# Patient Record
Sex: MALE | Race: WHITE | NOT HISPANIC OR LATINO | Employment: UNEMPLOYED | URBAN - NONMETROPOLITAN AREA
[De-identification: names, ages, dates, MRNs, and addresses within clinical notes are randomized per-mention and may not be internally consistent; named-entity substitution may affect disease eponyms.]

---

## 2024-03-27 ENCOUNTER — OFFICE VISIT (OUTPATIENT)
Dept: PRIMARY CARE | Facility: CLINIC | Age: 29
End: 2024-03-27
Payer: MEDICAID

## 2024-03-27 VITALS
DIASTOLIC BLOOD PRESSURE: 80 MMHG | WEIGHT: 276 LBS | BODY MASS INDEX: 41.83 KG/M2 | HEART RATE: 73 BPM | HEIGHT: 68 IN | SYSTOLIC BLOOD PRESSURE: 130 MMHG

## 2024-03-27 DIAGNOSIS — N50.89 TESTICLE LUMP: ICD-10-CM

## 2024-03-27 DIAGNOSIS — Z00.00 WELLNESS EXAMINATION: Primary | ICD-10-CM

## 2024-03-27 DIAGNOSIS — Z13.29 SCREENING FOR THYROID DISORDER: ICD-10-CM

## 2024-03-27 DIAGNOSIS — Z13.1 SCREENING FOR DIABETES MELLITUS: ICD-10-CM

## 2024-03-27 DIAGNOSIS — Z87.820 HISTORY OF CONCUSSION: ICD-10-CM

## 2024-03-27 DIAGNOSIS — Z13.220 SCREENING FOR LIPID DISORDERS: ICD-10-CM

## 2024-03-27 DIAGNOSIS — G43.709 CHRONIC MIGRAINE WITHOUT AURA WITHOUT STATUS MIGRAINOSUS, NOT INTRACTABLE: ICD-10-CM

## 2024-03-27 PROCEDURE — 99204 OFFICE O/P NEW MOD 45 MIN: CPT | Performed by: INTERNAL MEDICINE

## 2024-03-27 PROCEDURE — 99385 PREV VISIT NEW AGE 18-39: CPT | Performed by: INTERNAL MEDICINE

## 2024-03-27 PROCEDURE — 1036F TOBACCO NON-USER: CPT | Performed by: INTERNAL MEDICINE

## 2024-03-27 RX ORDER — DOXYCYCLINE 100 MG/1
100 CAPSULE ORAL 2 TIMES DAILY
Qty: 14 CAPSULE | Refills: 0 | Status: SHIPPED | OUTPATIENT
Start: 2024-03-27 | End: 2024-04-03

## 2024-03-27 RX ORDER — SUMATRIPTAN 50 MG/1
50 TABLET, FILM COATED ORAL ONCE AS NEEDED
Qty: 9 TABLET | Refills: 5 | Status: SHIPPED | OUTPATIENT
Start: 2024-03-27 | End: 2025-03-27

## 2024-03-27 ASSESSMENT — ENCOUNTER SYMPTOMS
APPETITE CHANGE: 0
ACTIVITY CHANGE: 0
SINUS PRESSURE: 0
NUMBNESS: 0
WHEEZING: 0
SHORTNESS OF BREATH: 0
BACK PAIN: 0
COUGH: 0
DEPRESSION: 1
CHILLS: 0
NAUSEA: 0
FATIGUE: 0
SINUS PAIN: 0
VOMITING: 0
WEAKNESS: 0
DIARRHEA: 0
ABDOMINAL DISTENTION: 0
SORE THROAT: 0

## 2024-03-27 ASSESSMENT — PATIENT HEALTH QUESTIONNAIRE - PHQ9
1. LITTLE INTEREST OR PLEASURE IN DOING THINGS: SEVERAL DAYS
2. FEELING DOWN, DEPRESSED OR HOPELESS: SEVERAL DAYS
SUM OF ALL RESPONSES TO PHQ9 QUESTIONS 1 AND 2: 2
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
## 2024-03-29 ENCOUNTER — HOSPITAL ENCOUNTER (OUTPATIENT)
Dept: RADIOLOGY | Facility: HOSPITAL | Age: 29
Discharge: HOME | End: 2024-03-29
Payer: MEDICAID

## 2024-03-29 DIAGNOSIS — N50.89 TESTICLE LUMP: ICD-10-CM

## 2024-03-29 PROCEDURE — 93975 VASCULAR STUDY: CPT

## 2024-03-29 PROCEDURE — 76870 US EXAM SCROTUM: CPT | Performed by: RADIOLOGY

## 2024-03-29 PROCEDURE — 93976 VASCULAR STUDY: CPT | Performed by: RADIOLOGY

## 2024-04-01 ENCOUNTER — HOSPITAL ENCOUNTER (OUTPATIENT)
Dept: RADIOLOGY | Facility: HOSPITAL | Age: 29
Discharge: HOME | End: 2024-04-01
Payer: MEDICAID

## 2024-04-01 DIAGNOSIS — G43.709 CHRONIC MIGRAINE WITHOUT AURA WITHOUT STATUS MIGRAINOSUS, NOT INTRACTABLE: ICD-10-CM

## 2024-04-01 DIAGNOSIS — Z87.820 HISTORY OF CONCUSSION: ICD-10-CM

## 2024-04-01 PROCEDURE — 70551 MRI BRAIN STEM W/O DYE: CPT | Performed by: RADIOLOGY

## 2024-04-01 PROCEDURE — 70551 MRI BRAIN STEM W/O DYE: CPT

## 2024-04-10 ENCOUNTER — OFFICE VISIT (OUTPATIENT)
Dept: UROLOGY | Facility: CLINIC | Age: 29
End: 2024-04-10
Payer: MEDICAID

## 2024-04-10 VITALS — BODY MASS INDEX: 41.97 KG/M2 | RESPIRATION RATE: 16 BRPM | WEIGHT: 276 LBS

## 2024-04-10 DIAGNOSIS — N43.3 HYDROCELE, UNSPECIFIED HYDROCELE TYPE: ICD-10-CM

## 2024-04-10 DIAGNOSIS — R35.1 NOCTURIA: ICD-10-CM

## 2024-04-10 DIAGNOSIS — N50.89 TESTICLE LUMP: ICD-10-CM

## 2024-04-10 PROCEDURE — 1036F TOBACCO NON-USER: CPT | Performed by: UROLOGY

## 2024-04-10 PROCEDURE — 99203 OFFICE O/P NEW LOW 30 MIN: CPT | Performed by: UROLOGY

## 2024-04-10 ASSESSMENT — ENCOUNTER SYMPTOMS
PSYCHIATRIC NEGATIVE: 1
COUGH: 0
ENDOCRINE NEGATIVE: 1
NAUSEA: 0
SHORTNESS OF BREATH: 0
DIFFICULTY URINATING: 0
FEVER: 0
EYES NEGATIVE: 1
ALLERGIC/IMMUNOLOGIC NEGATIVE: 1
CHILLS: 0

## 2024-04-10 NOTE — H&P (VIEW-ONLY)
Subjective   Patient ID: Titus Inman II is a 28 y.o. male.    HPI  Patient is here to establish for a testicular/scrotal lump that has been increasing in size. . Scrotal ultrasound showed a trace of bilateral hydroceles. He has had this for awhile. He said it occasionally would drain. It has not been draining recently. Patient states has some scrotal discomfort. No hematuria, No dysuria. Nocturia x 1, depending on fluid intake.       Review of Systems   Constitutional:  Negative for chills and fever.   HENT: Negative.     Eyes: Negative.    Respiratory:  Negative for cough and shortness of breath.    Cardiovascular:  Negative for chest pain and leg swelling.   Gastrointestinal:  Negative for nausea.   Endocrine: Negative.    Genitourinary:  Negative for difficulty urinating.        Negative except for documented in HPI   Allergic/Immunologic: Negative.    Neurological:         Alert & oriented X 3   Hematological:         Denies blood thinners   Psychiatric/Behavioral: Negative.         Objective   Physical Exam  Vitals and nursing note reviewed.   Constitutional:       General: He is not in acute distress.     Appearance: Normal appearance.   Pulmonary:      Effort: Pulmonary effort is normal.   Abdominal:      Tenderness: There is no abdominal tenderness.   Genitourinary:     Comments: Kidneys non palpable bilaterally  Bladder non palpable or tender  Scrotum no mass, No hydrocele Large sebaceuos cyst present 3cm  Epididymis- No spermatocele. Non Tender.  Testicles: No mass. Soft. Some atrophy  Urethra: No discharge  Penis within normal limits... No lesions. Circumcised  Prostate - deferred  Neurological:      Mental Status: He is alert.         Assessment/Plan   Diagnoses and all orders for this visit:  Testicle lump  -     Referral to Urology  Hydrocele, unspecified hydrocele type  Nocturia      U/S reviewed  Discussed op[tions for large sebaceous cyst  Will plan Surgical excision  Discussed monthly self  exams  Observe mild LUTS    F/U OR excision of scrotal cyst

## 2024-04-19 ENCOUNTER — PREP FOR PROCEDURE (OUTPATIENT)
Dept: UROLOGY | Facility: CLINIC | Age: 29
End: 2024-04-19
Payer: MEDICAID

## 2024-04-19 DIAGNOSIS — L72.9 SCROTAL CYST: Primary | ICD-10-CM

## 2024-04-24 NOTE — PREPROCEDURE INSTRUCTIONS
No outpatient medications have been marked as taking for the 4/30/24 encounter (Hospital Encounter).                       NPO Instructions:    Nothing to eat or drink after midnight    Additional Instructions:     Will need  home, will receive call day before surgery with arrival time                                                 no

## 2024-04-30 ENCOUNTER — HOSPITAL ENCOUNTER (OUTPATIENT)
Facility: HOSPITAL | Age: 29
Setting detail: OUTPATIENT SURGERY
Discharge: HOME | End: 2024-04-30
Attending: UROLOGY | Admitting: UROLOGY
Payer: MEDICAID

## 2024-04-30 ENCOUNTER — ANESTHESIA EVENT (OUTPATIENT)
Dept: OPERATING ROOM | Facility: HOSPITAL | Age: 29
End: 2024-04-30
Payer: MEDICAID

## 2024-04-30 ENCOUNTER — ANESTHESIA (OUTPATIENT)
Dept: OPERATING ROOM | Facility: HOSPITAL | Age: 29
End: 2024-04-30
Payer: MEDICAID

## 2024-04-30 VITALS
WEIGHT: 275 LBS | SYSTOLIC BLOOD PRESSURE: 112 MMHG | BODY MASS INDEX: 41.81 KG/M2 | TEMPERATURE: 97 F | OXYGEN SATURATION: 97 % | HEART RATE: 69 BPM | RESPIRATION RATE: 16 BRPM | DIASTOLIC BLOOD PRESSURE: 72 MMHG

## 2024-04-30 DIAGNOSIS — L72.9 SCROTAL CYST: Primary | ICD-10-CM

## 2024-04-30 PROBLEM — F43.10 PTSD (POST-TRAUMATIC STRESS DISORDER): Status: ACTIVE | Noted: 2024-04-30

## 2024-04-30 PROCEDURE — 2500000004 HC RX 250 GENERAL PHARMACY W/ HCPCS (ALT 636 FOR OP/ED): Performed by: ANESTHESIOLOGY

## 2024-04-30 PROCEDURE — 7100000002 HC RECOVERY ROOM TIME - EACH INCREMENTAL 1 MINUTE: Performed by: UROLOGY

## 2024-04-30 PROCEDURE — 11420 EXC H-F-NK-SP B9+MARG 0.5/<: CPT | Performed by: UROLOGY

## 2024-04-30 PROCEDURE — 2500000005 HC RX 250 GENERAL PHARMACY W/O HCPCS: Performed by: ANESTHESIOLOGY

## 2024-04-30 PROCEDURE — 7100000010 HC PHASE TWO TIME - EACH INCREMENTAL 1 MINUTE: Performed by: UROLOGY

## 2024-04-30 PROCEDURE — 3600000002 HC OR TIME - INITIAL BASE CHARGE - PROCEDURE LEVEL TWO: Performed by: UROLOGY

## 2024-04-30 PROCEDURE — 7100000001 HC RECOVERY ROOM TIME - INITIAL BASE CHARGE: Performed by: UROLOGY

## 2024-04-30 PROCEDURE — 2720000007 HC OR 272 NO HCPCS: Performed by: UROLOGY

## 2024-04-30 PROCEDURE — 3600000007 HC OR TIME - EACH INCREMENTAL 1 MINUTE - PROCEDURE LEVEL TWO: Performed by: UROLOGY

## 2024-04-30 PROCEDURE — 3700000002 HC GENERAL ANESTHESIA TIME - EACH INCREMENTAL 1 MINUTE: Performed by: UROLOGY

## 2024-04-30 PROCEDURE — 88304 TISSUE EXAM BY PATHOLOGIST: CPT | Mod: TC,SUR,SAMLAB | Performed by: UROLOGY

## 2024-04-30 PROCEDURE — 3700000001 HC GENERAL ANESTHESIA TIME - INITIAL BASE CHARGE: Performed by: UROLOGY

## 2024-04-30 PROCEDURE — 88304 TISSUE EXAM BY PATHOLOGIST: CPT | Performed by: STUDENT IN AN ORGANIZED HEALTH CARE EDUCATION/TRAINING PROGRAM

## 2024-04-30 PROCEDURE — 7100000009 HC PHASE TWO TIME - INITIAL BASE CHARGE: Performed by: UROLOGY

## 2024-04-30 RX ORDER — HYDROMORPHONE HYDROCHLORIDE 2 MG/ML
0.5 INJECTION, SOLUTION INTRAMUSCULAR; INTRAVENOUS; SUBCUTANEOUS EVERY 5 MIN PRN
Status: DISCONTINUED | OUTPATIENT
Start: 2024-04-30 | End: 2024-04-30 | Stop reason: HOSPADM

## 2024-04-30 RX ORDER — CEPHALEXIN 500 MG/1
500 CAPSULE ORAL 2 TIMES DAILY
Qty: 6 CAPSULE | Refills: 0 | Status: SHIPPED | OUTPATIENT
Start: 2024-04-30 | End: 2024-05-03

## 2024-04-30 RX ORDER — SODIUM CHLORIDE, SODIUM LACTATE, POTASSIUM CHLORIDE, CALCIUM CHLORIDE 600; 310; 30; 20 MG/100ML; MG/100ML; MG/100ML; MG/100ML
100 INJECTION, SOLUTION INTRAVENOUS CONTINUOUS
Status: DISCONTINUED | OUTPATIENT
Start: 2024-04-30 | End: 2024-04-30 | Stop reason: HOSPADM

## 2024-04-30 RX ORDER — MIDAZOLAM HYDROCHLORIDE 1 MG/ML
2 INJECTION INTRAMUSCULAR; INTRAVENOUS ONCE AS NEEDED
Status: COMPLETED | OUTPATIENT
Start: 2024-04-30 | End: 2024-04-30

## 2024-04-30 RX ORDER — MIDAZOLAM HYDROCHLORIDE 1 MG/ML
INJECTION INTRAMUSCULAR; INTRAVENOUS AS NEEDED
Status: DISCONTINUED | OUTPATIENT
Start: 2024-04-30 | End: 2024-04-30

## 2024-04-30 RX ORDER — ONDANSETRON HYDROCHLORIDE 2 MG/ML
4 INJECTION, SOLUTION INTRAVENOUS ONCE
Status: COMPLETED | OUTPATIENT
Start: 2024-04-30 | End: 2024-04-30

## 2024-04-30 RX ORDER — OXYCODONE HYDROCHLORIDE 5 MG/1
5 TABLET ORAL EVERY 4 HOURS PRN
Status: DISCONTINUED | OUTPATIENT
Start: 2024-04-30 | End: 2024-04-30 | Stop reason: HOSPADM

## 2024-04-30 RX ORDER — HYDROCODONE BITARTRATE AND ACETAMINOPHEN 5; 325 MG/1; MG/1
1 TABLET ORAL EVERY 6 HOURS PRN
Qty: 20 TABLET | Refills: 0 | Status: SHIPPED | OUTPATIENT
Start: 2024-04-30

## 2024-04-30 RX ORDER — PROPOFOL 10 MG/ML
INJECTION, EMULSION INTRAVENOUS AS NEEDED
Status: DISCONTINUED | OUTPATIENT
Start: 2024-04-30 | End: 2024-04-30

## 2024-04-30 RX ORDER — ONDANSETRON HYDROCHLORIDE 2 MG/ML
4 INJECTION, SOLUTION INTRAVENOUS ONCE AS NEEDED
Status: DISCONTINUED | OUTPATIENT
Start: 2024-04-30 | End: 2024-04-30 | Stop reason: HOSPADM

## 2024-04-30 RX ORDER — LIDOCAINE HYDROCHLORIDE 10 MG/ML
INJECTION, SOLUTION EPIDURAL; INFILTRATION; INTRACAUDAL; PERINEURAL AS NEEDED
Status: DISCONTINUED | OUTPATIENT
Start: 2024-04-30 | End: 2024-04-30

## 2024-04-30 RX ORDER — FENTANYL CITRATE 50 UG/ML
INJECTION, SOLUTION INTRAMUSCULAR; INTRAVENOUS AS NEEDED
Status: DISCONTINUED | OUTPATIENT
Start: 2024-04-30 | End: 2024-04-30

## 2024-04-30 RX ADMIN — FENTANYL CITRATE 100 MCG: 50 INJECTION, SOLUTION INTRAMUSCULAR; INTRAVENOUS at 11:14

## 2024-04-30 RX ADMIN — PROPOFOL 50 MG: 10 INJECTION, EMULSION INTRAVENOUS at 11:20

## 2024-04-30 RX ADMIN — MIDAZOLAM HYDROCHLORIDE 2 MG: 1 INJECTION, SOLUTION INTRAMUSCULAR; INTRAVENOUS at 11:05

## 2024-04-30 RX ADMIN — PROPOFOL 50 MG: 10 INJECTION, EMULSION INTRAVENOUS at 11:28

## 2024-04-30 RX ADMIN — SODIUM CHLORIDE, POTASSIUM CHLORIDE, SODIUM LACTATE AND CALCIUM CHLORIDE 100 ML/HR: 600; 310; 30; 20 INJECTION, SOLUTION INTRAVENOUS at 11:05

## 2024-04-30 RX ADMIN — MIDAZOLAM HYDROCHLORIDE 2 MG: 1 INJECTION, SOLUTION INTRAMUSCULAR; INTRAVENOUS at 11:14

## 2024-04-30 RX ADMIN — ONDANSETRON 4 MG: 2 INJECTION INTRAMUSCULAR; INTRAVENOUS at 11:04

## 2024-04-30 RX ADMIN — LIDOCAINE HYDROCHLORIDE 4 ML: 10 INJECTION, SOLUTION EPIDURAL; INFILTRATION; INTRACAUDAL; PERINEURAL at 11:14

## 2024-04-30 RX ADMIN — PROPOFOL 150 MG: 10 INJECTION, EMULSION INTRAVENOUS at 11:15

## 2024-04-30 RX ADMIN — PROPOFOL 50 MG: 10 INJECTION, EMULSION INTRAVENOUS at 11:34

## 2024-04-30 SDOH — HEALTH STABILITY: MENTAL HEALTH: CURRENT SMOKER: 0

## 2024-04-30 ASSESSMENT — PAIN SCALES - GENERAL
PAINLEVEL_OUTOF10: 0 - NO PAIN
PAINLEVEL_OUTOF10: 4
PAINLEVEL_OUTOF10: 0 - NO PAIN
PAIN_LEVEL: 3

## 2024-04-30 ASSESSMENT — PAIN - FUNCTIONAL ASSESSMENT: PAIN_FUNCTIONAL_ASSESSMENT: 0-10

## 2024-04-30 ASSESSMENT — COLUMBIA-SUICIDE SEVERITY RATING SCALE - C-SSRS
6. HAVE YOU EVER DONE ANYTHING, STARTED TO DO ANYTHING, OR PREPARED TO DO ANYTHING TO END YOUR LIFE?: NO
2. HAVE YOU ACTUALLY HAD ANY THOUGHTS OF KILLING YOURSELF?: NO
1. IN THE PAST MONTH, HAVE YOU WISHED YOU WERE DEAD OR WISHED YOU COULD GO TO SLEEP AND NOT WAKE UP?: NO

## 2024-04-30 NOTE — ANESTHESIA PREPROCEDURE EVALUATION
Patient: Titus Inman II    Procedure Information       Date/Time: 04/30/24 1230    Procedure: Excision Lesion Male Genitalia    Location: [facility] OR 01 / Virtual [facility] OR    Surgeons: Junior Barrera MD            Relevant Problems   Anesthesia (within normal limits)      Cardiac (within normal limits)      Pulmonary (within normal limits)      Neuro   (+) PTSD (post-traumatic stress disorder)      GI (within normal limits)      /Renal (within normal limits)      Liver (within normal limits)      Endocrine (within normal limits)      Hematology (within normal limits)      Musculoskeletal (within normal limits)      HEENT (within normal limits)      ID (within normal limits)      Skin (within normal limits)      GYN (within normal limits)       Clinical information reviewed:   Tobacco  Allergies  Meds  Problems  Med Hx  Surg Hx   Fam Hx  Soc   Hx        NPO Detail:  No data recorded     Physical Exam    Airway  Mallampati: II  TM distance: >3 FB  Neck ROM: full     Cardiovascular   Rhythm: regular  Rate: normal     Dental - normal exam     Pulmonary   Breath sounds clear to auscultation     Abdominal   (+) obese             Anesthesia Plan    History of general anesthesia?: no  History of complications of general anesthesia?: no    ASA 2     general     The patient is not a current smoker.    intravenous induction   Postoperative administration of opioids is intended.  Anesthetic plan and risks discussed with patient.

## 2024-04-30 NOTE — ANESTHESIA POSTPROCEDURE EVALUATION
Patient: Titus Inman II    Procedure Summary       Date: 04/30/24 Room / Location: [facility] OR 01 / Virtual [facility] OR    Anesthesia Start: 1111 Anesthesia Stop: 1153    Procedure: Excision Lesion Male Genitalia Diagnosis:       Scrotal cyst      (Scrotal cyst [L72.9])    Surgeons: Junior Barrera MD Responsible Provider: Kwesi Snell MD    Anesthesia Type: general ASA Status: 2            Anesthesia Type: general    Vitals Value Taken Time   BP  04/30/24 1153   Temp  04/30/24 1153   Pulse 80 04/30/24 1153   Resp 12 04/30/24 1153   SpO2 99 04/30/24 1153       Anesthesia Post Evaluation    Patient location during evaluation: PACU  Patient participation: complete - patient participated  Level of consciousness: awake and alert  Pain score: 3  Pain management: adequate  Multimodal analgesia pain management approach  Airway patency: patent  Cardiovascular status: acceptable  Respiratory status: acceptable  Hydration status: acceptable  Postoperative Nausea and Vomiting: none        No notable events documented.

## 2024-04-30 NOTE — ANESTHESIA PROCEDURE NOTES
Airway  Date/Time: 4/30/2024 11:16 AM  Urgency: elective    Airway not difficult    Staffing  Performed: attending   Authorized by: Kwesi Snell MD    Performed by: Kwesi Snell MD  Patient location during procedure: OR    Indications and Patient Condition  Indications for airway management: anesthesia  Spontaneous ventilation: present  Sedation level: deep  Preoxygenated: yes  Patient position: sniffing  Mask difficulty assessment: 1 - vent by mask    Final Airway Details  Final airway type: supraglottic airway      Successful airway: Supreme  Size 5     Number of attempts at approach: 1  Number of other approaches attempted: 0

## 2024-05-01 NOTE — OP NOTE
Excision Lesion Male Genitalia Operative Note     Date: 2024  OR Location: [facility] OR    Name: Titus Inman II, : 1995, Age: 28 y.o., MRN: [id number], Sex: male    Diagnosis  Pre-op Diagnosis     * Scrotal cyst [L72.9] Post-op Diagnosis     * Scrotal cyst [L72.9]     Procedures  Excision Lesion Male Genitalia  41567 - MO EXC B9 LESION MRGN XCP SK TG S/N/H/F/G 0.5 CM/<      Surgeons      * Junior Barrera - Primary    Resident/Fellow/Other Assistant:  Surgeons and Role:  * No surgeons found with a matching role *    Procedure Summary  Anesthesia: General  ASA: II  Anesthesia Staff: Anesthesiologist: Kwesi Snell MD  Estimated Blood Loss: 0mL  Intra-op Medications: Administrations occurring from 1230 to 1300 on 24:  * No intraprocedure medications in log *           Anesthesia Record               Intraprocedure I/O Totals       None           Specimen:   ID Type Source Tests Collected by Time   1 : Scrotal Mass Tissue SOFT TISSUE MASS RESECTION SURGICAL PATHOLOGY EXAM Junior Barrera MD 2024 1130        Staff:   Circulator: David Carmen RN  Scrub Person: Bere Fuentes RN              Indications: Titus Inman II is an 28 y.o. male who is having surgery for Scrotal cyst [L72.9].     The patient was seen in the preoperative area. The risks, benefits, complications, treatment options, non-operative alternatives, expected recovery and outcomes were discussed with the patient. The possibilities of reaction to medication, pulmonary aspiration, injury to surrounding structures, bleeding, recurrent infection, the need for additional procedures, failure to diagnose a condition, and creating a complication requiring transfusion or operation were discussed with the patient. The patient concurred with the proposed plan, giving informed consent.  The site of surgery was properly noted/marked if necessary per policy. The patient has been actively warmed in preoperative area.   Preoperative diagnosis:  Scrotal lesions/ sebaceous cyst  Postoperative diagnosis: same  Procedure Excision of  scrotal lesion  Physician: Bruce  Anesthesia: Gen.  Estimated blood loss: Minimal    Indications and consent: The patient presents for surgical  excision of scrotal lesion. After the risks, benefits, alternatives, and indications of the procedure were explained to the patient they consented.    Procedure: Patient was brought to the operating room and placed on the table in the supine position. After adequate anesthesia was obtained the patient was prepped and draped in the standard surgical fashion. The 3cm previously noted lesion was excised using an elliptical incision..  Electrocautery was used to obtain hemostasis and to fulgurate the base. The wound was then irrigated and then the scrotal skin edges were reapprozimated using interrupted sutures.  . Neosporin was applied to the treated areas. The patient tolerated the procedure well and there were no complications   Attending Attestation: I was present and scrubbed for the entire procedure.    Junior Barrera  Phone Number: [phone]

## 2024-05-07 LAB
LABORATORY COMMENT REPORT: NORMAL
PATH REPORT.FINAL DX SPEC: NORMAL
PATH REPORT.GROSS SPEC: NORMAL
PATH REPORT.RELEVANT HX SPEC: NORMAL
PATH REPORT.TOTAL CANCER: NORMAL

## 2024-05-15 ASSESSMENT — ENCOUNTER SYMPTOMS
COUGH: 0
DIFFICULTY URINATING: 0
ENDOCRINE NEGATIVE: 1
EYES NEGATIVE: 1
CHILLS: 0
NAUSEA: 0
ALLERGIC/IMMUNOLOGIC NEGATIVE: 1
FEVER: 0
PSYCHIATRIC NEGATIVE: 1
SHORTNESS OF BREATH: 0

## 2024-05-15 NOTE — PROGRESS NOTES
Subjective   Patient ID: Titus Inman II is a 28 y.o. male.    HPI  Patient is here for incision issues and to review path. . S/P excision of scrotal lesion on 5/1/24. He states 3-4 days after procedure his incision started to open up. He states this is starting to close slowly and is no longer an issue.  Path report showed epidermal inclusion cyst 1.1cm.       Review of Systems   Constitutional:  Negative for chills and fever.   HENT: Negative.     Eyes: Negative.    Respiratory:  Negative for cough and shortness of breath.    Cardiovascular:  Negative for chest pain and leg swelling.   Gastrointestinal:  Negative for nausea.   Endocrine: Negative.    Genitourinary:  Negative for difficulty urinating.        Negative except for documented in HPI   Allergic/Immunologic: Negative.    Neurological:         Alert & oriented X 3   Hematological:         Denies blood thinners   Psychiatric/Behavioral: Negative.         Objective   Physical Exam  Vitals and nursing note reviewed.   Constitutional:       General: He is not in acute distress.     Appearance: Normal appearance.   Pulmonary:      Effort: Pulmonary effort is normal.   Abdominal:      Tenderness: There is no abdominal tenderness.   Genitourinary:     Comments: Kidneys non palpable bilaterally  Bladder non palpable or tender  Scrotum no mass, No hydrocele. Healing well with minimal skin seperation. No erythema  Epididymis- No spermatocele. Non Tender.  Testicles: No mass. Symmetric  Urethra: No discharge.   Penis within normal limits... No lesions  Prostate - deferred  Neurological:      Mental Status: He is alert.       Assessment/Plan   Diagnoses and all orders for this visit:  Scrotal sebaceous cyst  Scrotal cyst      Path report reviewed.. Questions answered  Wound healing. No signs of infection    F/U PRN

## 2024-05-16 ENCOUNTER — OFFICE VISIT (OUTPATIENT)
Dept: UROLOGY | Facility: CLINIC | Age: 29
End: 2024-05-16
Payer: MEDICAID

## 2024-05-16 VITALS — RESPIRATION RATE: 16 BRPM | BODY MASS INDEX: 41.66 KG/M2 | WEIGHT: 274 LBS

## 2024-05-16 DIAGNOSIS — L72.3 SCROTAL SEBACEOUS CYST: Primary | ICD-10-CM

## 2024-05-16 DIAGNOSIS — L72.9 SCROTAL CYST: ICD-10-CM

## 2024-05-16 PROCEDURE — 1036F TOBACCO NON-USER: CPT | Performed by: UROLOGY

## 2024-05-16 PROCEDURE — 99213 OFFICE O/P EST LOW 20 MIN: CPT | Performed by: UROLOGY

## 2024-06-06 ENCOUNTER — LAB (OUTPATIENT)
Dept: LAB | Facility: LAB | Age: 29
End: 2024-06-06
Payer: MEDICAID

## 2024-06-06 DIAGNOSIS — Z13.220 SCREENING FOR LIPID DISORDERS: ICD-10-CM

## 2024-06-06 DIAGNOSIS — Z13.29 SCREENING FOR THYROID DISORDER: ICD-10-CM

## 2024-06-06 DIAGNOSIS — Z00.00 WELLNESS EXAMINATION: ICD-10-CM

## 2024-06-06 DIAGNOSIS — Z13.1 SCREENING FOR DIABETES MELLITUS: ICD-10-CM

## 2024-06-06 LAB
ALBUMIN SERPL BCP-MCNC: 4 G/DL (ref 3.4–5)
ALP SERPL-CCNC: 138 U/L (ref 33–120)
ALT SERPL W P-5'-P-CCNC: 15 U/L (ref 10–52)
ANION GAP SERPL CALC-SCNC: 11 MMOL/L (ref 10–20)
AST SERPL W P-5'-P-CCNC: 11 U/L (ref 9–39)
BASOPHILS # BLD AUTO: 0.07 X10*3/UL (ref 0–0.1)
BASOPHILS NFR BLD AUTO: 0.8 %
BILIRUB SERPL-MCNC: 0.8 MG/DL (ref 0–1.2)
BUN SERPL-MCNC: 11 MG/DL (ref 6–23)
CALCIUM SERPL-MCNC: 9 MG/DL (ref 8.6–10.3)
CHLORIDE SERPL-SCNC: 102 MMOL/L (ref 98–107)
CHOLEST SERPL-MCNC: 206 MG/DL (ref 0–199)
CHOLESTEROL/HDL RATIO: 5
CO2 SERPL-SCNC: 29 MMOL/L (ref 21–32)
CREAT SERPL-MCNC: 0.92 MG/DL (ref 0.5–1.3)
EGFRCR SERPLBLD CKD-EPI 2021: >90 ML/MIN/1.73M*2
EOSINOPHIL # BLD AUTO: 0.63 X10*3/UL (ref 0–0.7)
EOSINOPHIL NFR BLD AUTO: 6.9 %
ERYTHROCYTE [DISTWIDTH] IN BLOOD BY AUTOMATED COUNT: 11.9 % (ref 11.5–14.5)
EST. AVERAGE GLUCOSE BLD GHB EST-MCNC: 97 MG/DL
GLUCOSE SERPL-MCNC: 112 MG/DL (ref 74–99)
HBA1C MFR BLD: 5 %
HCT VFR BLD AUTO: 44.9 % (ref 41–52)
HDLC SERPL-MCNC: 41 MG/DL
HGB BLD-MCNC: 14.7 G/DL (ref 13.5–17.5)
IMM GRANULOCYTES # BLD AUTO: 0.03 X10*3/UL (ref 0–0.7)
IMM GRANULOCYTES NFR BLD AUTO: 0.3 % (ref 0–0.9)
LDLC SERPL CALC-MCNC: 143 MG/DL
LYMPHOCYTES # BLD AUTO: 1.53 X10*3/UL (ref 1.2–4.8)
LYMPHOCYTES NFR BLD AUTO: 16.7 %
MCH RBC QN AUTO: 28.2 PG (ref 26–34)
MCHC RBC AUTO-ENTMCNC: 32.7 G/DL (ref 32–36)
MCV RBC AUTO: 86 FL (ref 80–100)
MONOCYTES # BLD AUTO: 0.71 X10*3/UL (ref 0.1–1)
MONOCYTES NFR BLD AUTO: 7.8 %
NEUTROPHILS # BLD AUTO: 6.18 X10*3/UL (ref 1.2–7.7)
NEUTROPHILS NFR BLD AUTO: 67.5 %
NON HDL CHOLESTEROL: 165 MG/DL (ref 0–149)
NRBC BLD-RTO: 0 /100 WBCS (ref 0–0)
PLATELET # BLD AUTO: 348 X10*3/UL (ref 150–450)
POTASSIUM SERPL-SCNC: 4.1 MMOL/L (ref 3.5–5.3)
PROT SERPL-MCNC: 7 G/DL (ref 6.4–8.2)
RBC # BLD AUTO: 5.22 X10*6/UL (ref 4.5–5.9)
SODIUM SERPL-SCNC: 138 MMOL/L (ref 136–145)
TRIGL SERPL-MCNC: 112 MG/DL (ref 0–149)
TSH SERPL-ACNC: 1.58 MIU/L (ref 0.44–3.98)
VLDL: 22 MG/DL (ref 0–40)
WBC # BLD AUTO: 9.2 X10*3/UL (ref 4.4–11.3)

## 2024-06-06 PROCEDURE — 84443 ASSAY THYROID STIM HORMONE: CPT

## 2024-06-06 PROCEDURE — 83036 HEMOGLOBIN GLYCOSYLATED A1C: CPT

## 2024-06-06 PROCEDURE — 36415 COLL VENOUS BLD VENIPUNCTURE: CPT

## 2024-06-06 PROCEDURE — 85025 COMPLETE CBC W/AUTO DIFF WBC: CPT

## 2024-06-06 PROCEDURE — 80061 LIPID PANEL: CPT

## 2024-06-06 PROCEDURE — 80053 COMPREHEN METABOLIC PANEL: CPT

## 2024-06-25 ASSESSMENT — ENCOUNTER SYMPTOMS
CHILLS: 0
PSYCHIATRIC NEGATIVE: 1
SHORTNESS OF BREATH: 0
FEVER: 0
DIFFICULTY URINATING: 0
EYES NEGATIVE: 1
COUGH: 0
NAUSEA: 0
ENDOCRINE NEGATIVE: 1
ALLERGIC/IMMUNOLOGIC NEGATIVE: 1

## 2024-06-25 NOTE — PROGRESS NOTES
Subjective   Patient ID: Titus Inman II is a 29 y.o. male.    HPI  Patient is here for  S/P excision of scrotal lesion on 5/1/24. He states 3-4 days after procedure his incision started to open up. He states this is starting to close slowly and is no longer an issue.  Path report showed epidermal inclusion cyst 1.1cm.          Review of Systems   Constitutional:  Negative for chills and fever.   HENT: Negative.     Eyes: Negative.    Respiratory:  Negative for cough and shortness of breath.    Cardiovascular:  Negative for chest pain and leg swelling.   Gastrointestinal:  Negative for nausea.   Endocrine: Negative.    Genitourinary:  Negative for difficulty urinating.        Negative except for documented in HPI   Allergic/Immunologic: Negative.    Neurological:         Alert & oriented X 3   Hematological:         Denies blood thinners   Psychiatric/Behavioral: Negative.         Objective   Physical Exam  Vitals and nursing note reviewed.   Constitutional:       General: He is not in acute distress.     Appearance: Normal appearance.   Pulmonary:      Effort: Pulmonary effort is normal.   Abdominal:      Tenderness: There is no abdominal tenderness.   Genitourinary:     Comments: Kidneys non palpable bilaterally  Bladder non palpable or tender  Scrotum no mass, No hydrocele. Well healed  Epididymis- No spermatocele. Non Tender.  Testicles: No mass  Urethra: No discharge  Penis within normal limits... No lesions  Prostate - deferred  Neurological:      Mental Status: He is alert.         Assessment/Plan   Diagnoses and all orders for this visit:  Nocturia  Hydrocele, unspecified hydrocele type    Path report reviewed. Questions answered  Patient well helaed and will do self exams  Observe Mild LUTS    F/U PRN

## 2024-06-26 ENCOUNTER — APPOINTMENT (OUTPATIENT)
Dept: UROLOGY | Facility: CLINIC | Age: 29
End: 2024-06-26
Payer: MEDICAID

## 2024-06-26 VITALS — WEIGHT: 274 LBS | RESPIRATION RATE: 16 BRPM | BODY MASS INDEX: 41.66 KG/M2

## 2024-06-26 DIAGNOSIS — N43.3 HYDROCELE, UNSPECIFIED HYDROCELE TYPE: ICD-10-CM

## 2024-06-26 DIAGNOSIS — R35.1 NOCTURIA: ICD-10-CM

## 2024-06-26 PROCEDURE — 1036F TOBACCO NON-USER: CPT | Performed by: UROLOGY

## 2024-06-26 PROCEDURE — 99213 OFFICE O/P EST LOW 20 MIN: CPT | Performed by: UROLOGY

## 2024-06-27 ENCOUNTER — APPOINTMENT (OUTPATIENT)
Dept: PRIMARY CARE | Facility: CLINIC | Age: 29
End: 2024-06-27
Payer: MEDICAID

## 2024-06-27 VITALS
SYSTOLIC BLOOD PRESSURE: 120 MMHG | WEIGHT: 273 LBS | HEART RATE: 87 BPM | HEIGHT: 68 IN | DIASTOLIC BLOOD PRESSURE: 80 MMHG | BODY MASS INDEX: 41.37 KG/M2

## 2024-06-27 DIAGNOSIS — R20.0 BILATERAL HAND NUMBNESS: Primary | ICD-10-CM

## 2024-06-27 DIAGNOSIS — G43.E09 CHRONIC MIGRAINE WITH AURA WITHOUT STATUS MIGRAINOSUS, NOT INTRACTABLE: ICD-10-CM

## 2024-06-27 DIAGNOSIS — R11.0 NAUSEA: ICD-10-CM

## 2024-06-27 PROCEDURE — 1036F TOBACCO NON-USER: CPT | Performed by: INTERNAL MEDICINE

## 2024-06-27 PROCEDURE — 99214 OFFICE O/P EST MOD 30 MIN: CPT | Performed by: INTERNAL MEDICINE

## 2024-06-27 RX ORDER — ONDANSETRON 4 MG/1
4 TABLET, ORALLY DISINTEGRATING ORAL EVERY 8 HOURS PRN
Qty: 20 TABLET | Refills: 0 | Status: SHIPPED | OUTPATIENT
Start: 2024-06-27 | End: 2024-07-04

## 2024-06-27 ASSESSMENT — ENCOUNTER SYMPTOMS
WEAKNESS: 1
NUMBNESS: 0
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
## 2024-07-09 ENCOUNTER — TELEPHONE (OUTPATIENT)
Dept: PRIMARY CARE | Facility: CLINIC | Age: 29
End: 2024-07-09
Payer: MEDICAID

## 2024-07-09 DIAGNOSIS — G43.E09 CHRONIC MIGRAINE WITH AURA WITHOUT STATUS MIGRAINOSUS, NOT INTRACTABLE: Primary | ICD-10-CM

## 2024-07-09 DIAGNOSIS — R11.0 NAUSEA: ICD-10-CM

## 2024-07-09 RX ORDER — TOPIRAMATE 25 MG/1
25 TABLET ORAL 2 TIMES DAILY
Qty: 60 TABLET | Refills: 5 | Status: SHIPPED | OUTPATIENT
Start: 2024-07-09 | End: 2025-01-05

## 2024-07-09 RX ORDER — ONDANSETRON 4 MG/1
4 TABLET, ORALLY DISINTEGRATING ORAL EVERY 8 HOURS PRN
Qty: 20 TABLET | Refills: 0 | Status: SHIPPED | OUTPATIENT
Start: 2024-07-09 | End: 2024-07-16

## 2024-07-09 NOTE — TELEPHONE ENCOUNTER
Patient called in stating his insurance denied the ubrelvy that was prescribed 6/27/24. Was told he needs to try another medication for this before this med can be approved. Wanting to know where to go from here

## 2024-07-09 NOTE — TELEPHONE ENCOUNTER
I can call in Topamax and he can try that. Dr MORGAN will probably have to do a peer to peer otherwise

## 2024-07-11 ENCOUNTER — HOSPITAL ENCOUNTER (OUTPATIENT)
Dept: RADIOLOGY | Facility: CLINIC | Age: 29
Discharge: HOME | End: 2024-07-11
Payer: MEDICAID

## 2024-07-11 ENCOUNTER — APPOINTMENT (OUTPATIENT)
Dept: ORTHOPEDIC SURGERY | Facility: CLINIC | Age: 29
End: 2024-07-11
Payer: MEDICAID

## 2024-07-11 DIAGNOSIS — M79.641 BILATERAL HAND PAIN: ICD-10-CM

## 2024-07-11 DIAGNOSIS — R20.0 BILATERAL HAND NUMBNESS: ICD-10-CM

## 2024-07-11 DIAGNOSIS — M79.642 BILATERAL HAND PAIN: ICD-10-CM

## 2024-07-11 DIAGNOSIS — R20.0 BILATERAL HAND NUMBNESS: Primary | ICD-10-CM

## 2024-07-11 PROCEDURE — 99204 OFFICE O/P NEW MOD 45 MIN: CPT | Performed by: NURSE PRACTITIONER

## 2024-07-11 PROCEDURE — 73110 X-RAY EXAM OF WRIST: CPT | Mod: 50

## 2024-07-11 PROCEDURE — 73110 X-RAY EXAM OF WRIST: CPT | Mod: BILATERAL PROCEDURE | Performed by: RADIOLOGY

## 2024-07-11 PROCEDURE — L3908 WHO COCK-UP NONMOLDE PRE OTS: HCPCS | Performed by: NURSE PRACTITIONER

## 2024-07-11 RX ORDER — MELOXICAM 15 MG/1
15 TABLET ORAL DAILY
Qty: 30 TABLET | Refills: 11 | Status: SHIPPED | OUTPATIENT
Start: 2024-07-11 | End: 2025-07-11

## 2024-07-11 ASSESSMENT — ENCOUNTER SYMPTOMS
PSYCHIATRIC NEGATIVE: 1
ENDOCRINE NEGATIVE: 1
NEUROLOGICAL NEGATIVE: 1
CONSTITUTIONAL NEGATIVE: 1
CARDIOVASCULAR NEGATIVE: 1
RESPIRATORY NEGATIVE: 1
HEMATOLOGIC/LYMPHATIC NEGATIVE: 1
ARTHRALGIAS: 1

## 2024-07-11 NOTE — PROGRESS NOTES
Subjective    Patient ID: Titus Inman II is a 29 y.o. male.    Chief Complaint: Bilateral hand pain and numbness      HPI  Titus is a pleasant 29-year-old gentleman presenting for new patient evaluation of bilateral hand pain and numbness  Bilat hand pain for several yrs, worse last 2 yrs  No nighttime awakening  No meds attempted in last couple yrs, reports hx of no effect unless takinglarge amounts  Attempted topical product intermittent, helps some- does not know name of product  Compression gloveshelp some (x2-3 weeks)  Inquiring about bracing to keep hands open and prevent fist making  First visit for this today  Medical cannabis helps some (uses mainly for PTSD)  Works in factory work and cooking, aggravatse sx  L worse than R  Some tingling into index finger  RH dominant  Frequent popping of joints   Worse after working with hands, sx can last a couple days    Review of Systems   Constitutional: Negative.    HENT: Negative.     Respiratory: Negative.     Cardiovascular: Negative.    Endocrine: Negative.    Musculoskeletal:  Positive for arthralgias.   Skin: Negative.    Neurological: Negative.    Hematological: Negative.    Psychiatric/Behavioral: Negative.        Objective   Ortho Exam    Image Results:  Independent review of bilateral wrist imaging completed during today's visit    Await radiology report    Assessment/Plan   Encounter Diagnoses:

## 2024-07-24 ENCOUNTER — APPOINTMENT (OUTPATIENT)
Dept: PHYSICAL THERAPY | Facility: CLINIC | Age: 29
End: 2024-07-24
Payer: MEDICAID

## 2024-07-30 ENCOUNTER — HOSPITAL ENCOUNTER (OUTPATIENT)
Dept: HOSPITAL 100 - SL | Age: 29
Discharge: HOME | End: 2024-07-30
Payer: MEDICAID

## 2024-07-30 DIAGNOSIS — G47.00: ICD-10-CM

## 2024-07-30 DIAGNOSIS — G47.10: Primary | ICD-10-CM

## 2024-07-30 PROCEDURE — 95806 SLEEP STUDY UNATT&RESP EFFT: CPT

## 2024-08-05 ENCOUNTER — EVALUATION (OUTPATIENT)
Dept: OCCUPATIONAL THERAPY | Facility: CLINIC | Age: 29
End: 2024-08-05
Payer: MEDICAID

## 2024-08-05 DIAGNOSIS — R20.0 BILATERAL HAND NUMBNESS: ICD-10-CM

## 2024-08-05 PROCEDURE — 97165 OT EVAL LOW COMPLEX 30 MIN: CPT | Mod: GO

## 2024-08-05 PROCEDURE — 97110 THERAPEUTIC EXERCISES: CPT | Mod: GO

## 2024-08-05 ASSESSMENT — PAIN SCALES - GENERAL
PAINLEVEL_OUTOF10: 4
PAINLEVEL_OUTOF10: 5 - MODERATE PAIN

## 2024-08-05 ASSESSMENT — ENCOUNTER SYMPTOMS
OCCASIONAL FEELINGS OF UNSTEADINESS: 0
LOSS OF SENSATION IN FEET: 0
DEPRESSION: 1

## 2024-08-05 ASSESSMENT — PATIENT HEALTH QUESTIONNAIRE - PHQ9
2. FEELING DOWN, DEPRESSED OR HOPELESS: NEARLY EVERY DAY
6. FEELING BAD ABOUT YOURSELF - OR THAT YOU ARE A FAILURE OR HAVE LET YOURSELF OR YOUR FAMILY DOWN: NEARLY EVERY DAY
7. TROUBLE CONCENTRATING ON THINGS, SUCH AS READING THE NEWSPAPER OR WATCHING TELEVISION: MORE THAN HALF THE DAYS
10. IF YOU CHECKED OFF ANY PROBLEMS, HOW DIFFICULT HAVE THESE PROBLEMS MADE IT FOR YOU TO DO YOUR WORK, TAKE CARE OF THINGS AT HOME, OR GET ALONG WITH OTHER PEOPLE: VERY DIFFICULT
1. LITTLE INTEREST OR PLEASURE IN DOING THINGS: SEVERAL DAYS
8. MOVING OR SPEAKING SO SLOWLY THAT OTHER PEOPLE COULD HAVE NOTICED. OR THE OPPOSITE, BEING SO FIGETY OR RESTLESS THAT YOU HAVE BEEN MOVING AROUND A LOT MORE THAN USUAL: NEARLY EVERY DAY
SUM OF ALL RESPONSES TO PHQ9 QUESTIONS 1 AND 2: 4
9. THOUGHTS THAT YOU WOULD BE BETTER OFF DEAD, OR OF HURTING YOURSELF: NOT AT ALL
SUM OF ALL RESPONSES TO PHQ QUESTIONS 1-9: 18
3. TROUBLE FALLING OR STAYING ASLEEP OR SLEEPING TOO MUCH: NEARLY EVERY DAY
5. POOR APPETITE OR OVEREATING: MORE THAN HALF THE DAYS
4. FEELING TIRED OR HAVING LITTLE ENERGY: SEVERAL DAYS

## 2024-08-05 ASSESSMENT — PAIN DESCRIPTION - DESCRIPTORS
DESCRIPTORS: NUMBNESS;PINS AND NEEDLES
DESCRIPTORS: ACHING;NUMBNESS;PINS AND NEEDLES

## 2024-08-05 ASSESSMENT — PAIN - FUNCTIONAL ASSESSMENT: PAIN_FUNCTIONAL_ASSESSMENT: 0-10

## 2024-08-05 ASSESSMENT — PAIN SCALES - WONG BAKER: WONGBAKER_NUMERICALRESPONSE: HURTS EVEN MORE
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
## 2024-08-07 ENCOUNTER — APPOINTMENT (OUTPATIENT)
Dept: ORTHOPEDIC SURGERY | Facility: CLINIC | Age: 29
End: 2024-08-07
Payer: MEDICAID

## 2024-08-07 DIAGNOSIS — R20.0 BILATERAL HAND NUMBNESS: Primary | ICD-10-CM

## 2024-08-07 DIAGNOSIS — M79.642 BILATERAL HAND PAIN: ICD-10-CM

## 2024-08-07 DIAGNOSIS — M79.641 BILATERAL HAND PAIN: ICD-10-CM

## 2024-08-07 PROCEDURE — 99214 OFFICE O/P EST MOD 30 MIN: CPT | Performed by: NURSE PRACTITIONER

## 2024-08-07 PROCEDURE — 1036F TOBACCO NON-USER: CPT | Performed by: NURSE PRACTITIONER

## 2024-08-07 ASSESSMENT — ENCOUNTER SYMPTOMS
ARTHRALGIAS: 1
CONSTITUTIONAL NEGATIVE: 1
ENDOCRINE NEGATIVE: 1
NEUROLOGICAL NEGATIVE: 1
RESPIRATORY NEGATIVE: 1
CARDIOVASCULAR NEGATIVE: 1
HEMATOLOGIC/LYMPHATIC NEGATIVE: 1
PSYCHIATRIC NEGATIVE: 1

## 2024-08-07 ASSESSMENT — PAIN - FUNCTIONAL ASSESSMENT: PAIN_FUNCTIONAL_ASSESSMENT: 0-10

## 2024-08-07 ASSESSMENT — PAIN SCALES - GENERAL: PAINLEVEL_OUTOF10: 4
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
## 2024-08-07 NOTE — ASSESSMENT & PLAN NOTE
Sx control with previously prescribed meloxicam once daily with food or snack,  Tylenol prn.   May continue with topical OTC products for pain relief per package directions  Nighttime bracing and prn for repetitive or aggravating activities.   Advised patient to call OT to schedule session  Encourage patient to continue to follow with neurology and contact the office to see if EMG has been ordered  We did discuss having his PCP be the [facility] for his care as he is seeing multiple providers and patient has difficulty recalling things.  Plan will be to reassess after completing OT, if symptoms continue or worsen, will order EMG if not previously ordered by one of his other providers.  Patient in agreement of the plan of care  This note was generated using Dragon software.  It may contain errors in wording, punctuation or spelling.

## 2024-08-08 ENCOUNTER — APPOINTMENT (OUTPATIENT)
Dept: ORTHOPEDIC SURGERY | Facility: CLINIC | Age: 29
End: 2024-08-08
Payer: MEDICAID

## 2024-08-14 ENCOUNTER — TREATMENT (OUTPATIENT)
Dept: OCCUPATIONAL THERAPY | Facility: CLINIC | Age: 29
End: 2024-08-14
Payer: MEDICAID

## 2024-08-14 DIAGNOSIS — R20.0 BILATERAL HAND NUMBNESS: ICD-10-CM

## 2024-08-14 PROCEDURE — 97530 THERAPEUTIC ACTIVITIES: CPT | Mod: GO,CO

## 2024-08-14 PROCEDURE — 97110 THERAPEUTIC EXERCISES: CPT | Mod: GO,CO

## 2024-08-14 ASSESSMENT — PAIN - FUNCTIONAL ASSESSMENT: PAIN_FUNCTIONAL_ASSESSMENT: 0-10

## 2024-08-14 ASSESSMENT — PAIN SCALES - GENERAL
PAINLEVEL_OUTOF10: 0 - NO PAIN
PAINLEVEL_OUTOF10: 0 - NO PAIN

## 2024-08-14 ASSESSMENT — PAIN SCALES - WONG BAKER: WONGBAKER_NUMERICALRESPONSE: NO HURT

## 2024-08-14 NOTE — PROGRESS NOTES
Occupational Therapy    Occupational Therapy Treatment    Name: Titus Inman II  MRN: [id number]  : 1995  Date: 24    Time Entry:  Time Calculation  Start Time: 1130  Stop Time: 1215  Time Calculation (min): 45 min        OT Therapeutic Procedures Time Entry  Therapeutic Activity Time Entry: 20  Therapeutic Exercise Time Entry: 25                Assessment: Patient c/o fatigue and increased pain in L hand only w/ MRMT board this date, demos understanding of HEP's provided.    Plan: Continue with current POC towards OT goals  OT Plan: TE, TA, Modalities  Duration: 4 weeks  Onset Date: 24  Rehab Potential: Good  Plan of Care Agreement: Patient    Subjective   General: Patient states he had and EMG done on 24. Patient states his hands feel okay right now however do become super painful     General  Reason for Referral: Eval and Treat  Referred By: Esperanza Bowles CNP  General Comment: Visit 2    Pain Assessment:  Pain Assessment  Pain Assessment: 0-10  0-10 (Numeric) Pain Score: 0 - No pain  Pain Location: Hand  Pain Orientation: Right  Pain 2  Pain Score 2: 0 - No pain  Gant-Pope FACES Pain Rating 2: No hurt  Pain Location 2: Hand  Pain Orientation 2: Left    Objective        Therapy/Activity: Therapeutic Exercise  Therapeutic Exercise Performed: Yes  Therapeutic Exercise Activity 1: Initiated T band exercises with lime green band and HEP (Bicep curls/elbow extension/ IR/ER 1 set of 10 1x/day)  Therapeutic Exercise Activity 2: Initiated T foam exercises w/ blue foam blocks and HEP (Full hand , pad-to-pad pinch, 3 jaw pinch 1x/day 1 set of 15 each)    Therapeutic Activity  Therapeutic Activity Performed: Yes  Therapeutic Activity 1: Educated on heat/ice for pain  Therapeutic Activity 2: B hands on short handle reacher 5#s resistance flipping pegs on MRMT board x 30 each hand          Goals:  Active       OT Goals       OT Goal 1       Start:  24    Expected End:  24        Pt will report less dropping of objects over the course of 4 weeks.  Pt will reduce Quick Dash score to approx 20.         OT Goal 2       Start:  08/05/24    Expected End:  09/02/24       Pt will be independent with HEP carryover in order to reduce symptoms and improve hand function per pt report.

## 2024-08-21 ENCOUNTER — TREATMENT (OUTPATIENT)
Dept: OCCUPATIONAL THERAPY | Facility: CLINIC | Age: 29
End: 2024-08-21
Payer: MEDICAID

## 2024-08-21 DIAGNOSIS — R20.0 BILATERAL HAND NUMBNESS: ICD-10-CM

## 2024-08-21 PROCEDURE — 97110 THERAPEUTIC EXERCISES: CPT | Mod: GO,CO

## 2024-08-21 PROCEDURE — 97530 THERAPEUTIC ACTIVITIES: CPT | Mod: GO,CO

## 2024-08-21 ASSESSMENT — PAIN DESCRIPTION - DESCRIPTORS
DESCRIPTORS: ACHING
DESCRIPTORS: ACHING;TINGLING

## 2024-08-21 ASSESSMENT — PAIN SCALES - GENERAL
PAINLEVEL_OUTOF10: 0 - NO PAIN
PAINLEVEL_OUTOF10: 5 - MODERATE PAIN

## 2024-08-21 ASSESSMENT — PAIN - FUNCTIONAL ASSESSMENT: PAIN_FUNCTIONAL_ASSESSMENT: 0-10

## 2024-08-21 ASSESSMENT — PAIN SCALES - WONG BAKER: WONGBAKER_NUMERICALRESPONSE: HURTS EVEN MORE

## 2024-08-21 NOTE — PROGRESS NOTES
Occupational Therapy    Occupational Therapy Treatment    Name: Titus Inman II  MRN: [id number]  : 1995  Date: 24    Time Entry:  Time Calculation  Start Time: 0950  Stop Time: 1035  Time Calculation (min): 45 min        OT Therapeutic Procedures Time Entry  Therapeutic Activity Time Entry: 35  Therapeutic Exercise Time Entry: 10                Assessment: Demos understanding of T putty HEP and Isotoner gloves. Some increased pain reported with exercises. States heat helps with pain. Eductaed to use heat at home after exercises    Plan: Continue with current POC towards OT goals. Waiting for auth for one more visit for recheck to be scheduled  OT Plan: TE, TA, Modalities  Duration: 4 weeks  Onset Date: 24  Rehab Potential: Good  Plan of Care Agreement: Patient    Subjective   General: Patient states he has been struggling to do dishes at home states hands are sore and feel weak. States hand get very sore after exercises    General  Reason for Referral: Eval and Treat  Referred By: Esperanza Bowles CNP  General Comment: Visit 3    Pain Assessment:  Pain Assessment  Pain Assessment: 0-10  0-10 (Numeric) Pain Score: 5 - Moderate pain  Pain Location: Hand  Pain Orientation: Right  Pain Descriptors: Aching  Pain Frequency: Constant/continuous  Clinical Progression: Not changed  Pain 2  Pain Score 2: 0 - No pain  Gant-Pope FACES Pain Rating 2: Hurts even more  Pain Type 2: Chronic pain  Pain Location 2: Hand  Pain Orientation 2: Left  Pain Descriptors 2: Aching, Tingling  Pain Frequency 2: Constant/continuous  Clinical Progression 2: Not changed    Objective      Modalities:  Modalities Used: Yes  Modality 1: Untimed Hotpack    Therapy/Activity: Therapeutic Exercise  Therapeutic Exercise Performed: Yes  Therapeutic Exercise Activity 1: Initiated T putty exercises w/ pink putty and HEP provided (flattening, pinch and pull and full hand  x 10 1x/day)    Therapeutic Activity  Therapeutic  Activity Performed: Yes  Therapeutic Activity 1: Patient interview with hot pack donned  Therapeutic Activity 2: B hands pulling apart green T putty to locate beads  Therapeutic Activity 3: B hands flattening green T putty x 10 each  Therapeutic Activity 4: Educated on and provided with Isotoner glove (will try for a week and return next week if no difference)        Goals:  Active       OT Goals       OT Goal 1       Start:  08/05/24    Expected End:  09/02/24       Pt will report less dropping of objects over the course of 4 weeks.  Pt will reduce Quick Dash score to approx 20.         OT Goal 2       Start:  08/05/24    Expected End:  09/02/24       Pt will be independent with HEP carryover in order to reduce symptoms and improve hand function per pt report.

## 2024-08-28 ENCOUNTER — TREATMENT (OUTPATIENT)
Dept: OCCUPATIONAL THERAPY | Facility: CLINIC | Age: 29
End: 2024-08-28
Payer: MEDICAID

## 2024-08-28 DIAGNOSIS — R20.0 BILATERAL HAND NUMBNESS: ICD-10-CM

## 2024-08-28 PROCEDURE — 97530 THERAPEUTIC ACTIVITIES: CPT | Mod: GO,CO

## 2024-08-28 PROCEDURE — L3912 HFO FLEXION GLOVE PRE OTS: HCPCS

## 2024-08-28 ASSESSMENT — PAIN SCALES - GENERAL
PAINLEVEL_OUTOF10: 5 - MODERATE PAIN
PAINLEVEL_OUTOF10: 5 - MODERATE PAIN

## 2024-08-28 ASSESSMENT — PAIN DESCRIPTION - DESCRIPTORS: DESCRIPTORS: ACHING

## 2024-08-28 ASSESSMENT — PAIN - FUNCTIONAL ASSESSMENT: PAIN_FUNCTIONAL_ASSESSMENT: 0-10

## 2024-08-28 NOTE — PROGRESS NOTES
"Occupational Therapy    Occupational Therapy Treatment    Name: Titus Inman II  MRN: [id number]  : 1995  Date: 24    Time Entry:  Time Calculation  Start Time: 0950  Stop Time: 1035  Time Calculation (min): 45 min        OT Therapeutic Procedures Time Entry  Therapeutic Activity Time Entry: 45                Assessment: Patient states paraffin helped a little. States tennis ball activity is a little difficult d/t feeling like he is going to drop it, fatigue reported in hand and shoulder (d/t overcompensation) with activities. No increase in pain reported.    Plan: Continue with current POC towards OT goals  OT Plan: TE, TA, Modalities  Duration: 4 weeks  Onset Date: 24  Rehab Potential: Good  Plan of Care Agreement: Patient    Subjective   General: Patient states hands feel the same, states he has used the isotoner gloves a couple of times, states thy make the palm of his hands feel hard sometimes because \"he resists them\" Patient states it has been difficult to do exercises because he does not have a good place at home to do them.    General  Reason for Referral: Eval and Treat  Referred By: Esperanza Bowles CNP  General Comment: Visit 4    Pain Assessment:  Pain Assessment  Pain Assessment: 0-10  0-10 (Numeric) Pain Score: 5 - Moderate pain  Pain Location: Hand  Pain Orientation: Right  Pain Descriptors: Aching  Clinical Progression: Not changed  Pain 2  Pain Score 2: 5 - Moderate pain  Pain Type 2: Chronic pain  Pain Location 2: Hand  Pain Orientation 2: Left  Clinical Progression 2: Not changed    Objective      Modalities:  Modalities Used: Yes  Modality 1: Untimed Other: (comment) (Paraffin donned to B hands x 8 dips each)    Therapy/Activity:   Therapeutic Activity  Therapeutic Activity Performed: Yes  Therapeutic Activity 1: Patient interview with paraffin donned  Therapeutic Activity 2: B hands on tennis ball with mpouth picking up flat rocks to place into container x 25 each " hand  Therapeutic Activity 3: Educated on benefits of heat and how to protect skin with numbness  Therapeutic Activity 4: Educated on rubber gloves with  for dishes to avoid dropping        Goals:  Active       OT Goals       OT Goal 1       Start:  08/05/24    Expected End:  09/02/24       Pt will report less dropping of objects over the course of 4 weeks.  Pt will reduce Quick Dash score to approx 20.         OT Goal 2       Start:  08/05/24    Expected End:  09/02/24       Pt will be independent with HEP carryover in order to reduce symptoms and improve hand function per pt report.

## 2024-09-04 ENCOUNTER — DOCUMENTATION (OUTPATIENT)
Dept: OCCUPATIONAL THERAPY | Facility: CLINIC | Age: 29
End: 2024-09-04
Payer: MEDICAID

## 2024-09-04 NOTE — PROGRESS NOTES
Occupational Therapy                 Therapy Communication Note    Patient Name: Titus Inman II  MRN: [id number]  Today's Date: 9/4/2024     Discipline: Occupational Therapy    Missed Visit Reason:   Unknown, attempted to call no answer, VM left reminded of next apt    Missed Time: No Show    Comment: This is patients first cancel/no show this POC, next apt recheck with OT

## 2024-09-05 ENCOUNTER — TREATMENT (OUTPATIENT)
Dept: OCCUPATIONAL THERAPY | Facility: CLINIC | Age: 29
End: 2024-09-05
Payer: MEDICAID

## 2024-09-05 DIAGNOSIS — R20.0 BILATERAL HAND NUMBNESS: ICD-10-CM

## 2024-09-05 PROCEDURE — 97530 THERAPEUTIC ACTIVITIES: CPT | Mod: GO,CO

## 2024-09-05 PROCEDURE — 97110 THERAPEUTIC EXERCISES: CPT | Mod: GO,CO

## 2024-09-05 ASSESSMENT — PAIN SCALES - GENERAL
PAINLEVEL_OUTOF10: 1
PAINLEVEL_OUTOF10: 1

## 2024-09-05 ASSESSMENT — PAIN DESCRIPTION - DESCRIPTORS
DESCRIPTORS: NUMBNESS
DESCRIPTORS: NUMBNESS;TINGLING

## 2024-09-05 ASSESSMENT — PAIN - FUNCTIONAL ASSESSMENT: PAIN_FUNCTIONAL_ASSESSMENT: 0-10

## 2024-09-05 NOTE — PROGRESS NOTES
Occupational Therapy    Occupational Therapy Treatment    Name: Titus Inman II  MRN: [id number]  : 1995  Date: 24    Time Entry:  Time Calculation  Start Time: 1300  Stop Time: 1341  Time Calculation (min): 41 min        OT Therapeutic Procedures Time Entry  Therapeutic Activity Time Entry: 11  Therapeutic Exercise Time Entry: 30                Assessment:  Patient c/o increased pain from tip of index finger down middle of volar wrist. Demos understanding of using heat and continuing with stretches/HEP.    Plan: Continue with current POC towards OT goals  OT Plan: TE, TA, Modalities  Duration: 4 weeks  Onset Date: 24  Rehab Potential: Good  Plan of Care Agreement: Patient    Subjective   General: (Today's apt is a reschedule from yesterday's No show) patient states he is doing okay. No pain in hands today N&T reported     General  Reason for Referral: Eval and Treat  Referred By: Esperanza Bowles CNP  General Comment: Visit 5    Pain Assessment:  Pain Assessment  Pain Assessment: 0-10  0-10 (Numeric) Pain Score: 1  Pain Location: Hand  Pain Orientation: Right  Pain Descriptors: Numbness  Pain 2  Pain Score 2: 1  Pain Location 2: Hand  Pain Orientation 2: Left  Pain Descriptors 2: Numbness, Tingling    Objective      Modalities:  Modalities Used: Yes  Modality 1: Untimed Other: (comment) (Paraffin donned to B hands x 8 dips each)    Therapy/Activity: Therapeutic Exercise  Therapeutic Exercise Performed: Yes  Therapeutic Exercise Activity 1: B hands squeezing weighted ball x 20 w/ 5 second holds each  Therapeutic Exercise Activity 2: B hands yellow flex bar U shape/rainbow shape x 15 w/ 5 second holds each  Therapeutic Exercise Activity 3: B hands yellow power web digit flex/ext x 15 w/ 5 second holds each    Therapeutic Activity  Therapeutic Activity Performed: Yes  Therapeutic Activity 1: Re-education on heat for pain  Therapeutic Activity 2: Re-education on stretches, continuing  Use artificial tears to affected eye(s) as needed. HEP        Goals:  Active       OT Goals       OT Goal 1       Start:  08/05/24    Expected End:  09/02/24       Pt will report less dropping of objects over the course of 4 weeks.  Pt will reduce Quick Dash score to approx 20.         OT Goal 2       Start:  08/05/24    Expected End:  09/02/24       Pt will be independent with HEP carryover in order to reduce symptoms and improve hand function per pt report.

## 2024-09-17 ENCOUNTER — APPOINTMENT (OUTPATIENT)
Dept: ORTHOPEDIC SURGERY | Facility: CLINIC | Age: 29
End: 2024-09-17
Payer: MEDICAID

## 2024-09-17 DIAGNOSIS — M79.642 BILATERAL HAND PAIN: ICD-10-CM

## 2024-09-17 DIAGNOSIS — R20.0 BILATERAL HAND NUMBNESS: Primary | ICD-10-CM

## 2024-09-17 DIAGNOSIS — M79.641 BILATERAL HAND PAIN: ICD-10-CM

## 2024-09-17 PROCEDURE — 1036F TOBACCO NON-USER: CPT | Performed by: NURSE PRACTITIONER

## 2024-09-17 PROCEDURE — 99214 OFFICE O/P EST MOD 30 MIN: CPT | Performed by: NURSE PRACTITIONER

## 2024-09-17 ASSESSMENT — ENCOUNTER SYMPTOMS
HEMATOLOGIC/LYMPHATIC NEGATIVE: 1
CONSTITUTIONAL NEGATIVE: 1
CARDIOVASCULAR NEGATIVE: 1
ENDOCRINE NEGATIVE: 1
ARTHRALGIAS: 1
RESPIRATORY NEGATIVE: 1
NEUROLOGICAL NEGATIVE: 1
PSYCHIATRIC NEGATIVE: 1

## 2024-09-17 ASSESSMENT — PAIN DESCRIPTION - DESCRIPTORS
DESCRIPTORS: DULL
DESCRIPTORS: ACHING;PINS AND NEEDLES

## 2024-09-17 ASSESSMENT — PAIN SCALES - GENERAL
PAINLEVEL_OUTOF10: 7
PAINLEVEL_OUTOF10: 5 - MODERATE PAIN

## 2024-09-17 ASSESSMENT — PAIN - FUNCTIONAL ASSESSMENT: PAIN_FUNCTIONAL_ASSESSMENT: 0-10

## 2024-09-17 NOTE — ASSESSMENT & PLAN NOTE
Patient to continue use with compression gloves, weighted and heated mittens and nighttime bracing for symptom control  Complete OT eval visit for further recommendation  Continue home exercises daily as instructed  Referring for neurology evaluation, patient states he has seen a neurologist recently and is waiting for a follow-up appointment.  Referral to address hands provided.  PCP will be included on this note, reviewed labs from initial workup in June 2024 with no significantly abnormal findings.  We did discuss possibility of addressing vitamin levels either with PCP or neurology  Collaborated case with Dr. Bourgeois on date of visit who is in agreement with this plan of care  Patient to follow-up here on as needed basis  Patient in agreement of the plan of care  This note was generated using Dragon software.  It may contain errors in wording, punctuation or spelling.

## 2024-09-17 NOTE — LETTER
September 17, 2024     Shannan Lira DO  [address]  [facility] [address]    Patient: Titus Inman II   YOB: 1995   Date of Visit: 9/17/2024       Dear Dr. Shannan Lira DO:    Thank you for referring Titus Inman to me for evaluation. Below are my notes for this consultation.  If you have questions, please do not hesitate to call me. I look forward to following your patient along with you.       Sincerely,     Esperanza Bowles, APRN-CNP      CC: No Recipients  ______________________________________________________________________________________    Subjective    Patient ID: Titus Inman II is a 29 y.o. male.    Chief Complaint: Bilateral hand pain and numbness    HPI    Titus is a pleasant 29-year-old gentleman presenting for FUV of bilateral hand pain and numbness  Patient uses compression gloves, weighted and heated mittens and nighttime wrist bracing  Medical cannabis helps some (uses mainly for PTSD)  Works in factory work and cooking, aggravates sx  RH dominant  Frequent popping of joints   Worse after working with hands  Patient did complete EMG, reports OT reported as within normal limits  Patient is performing home exercises on daily basis and tolerating fair to well  Overall minimal change in pain or numb and tingling to bilateral hands  Occasional sharp shooting pains, mainly affecting the left hand from wrist distally  Meloxicam - not taking regularly, does not note any improvement when using  Describes sensory decrease in L hand  Portz concern for weak hand  and grasp strength, using upper arm muscles to accommodate    Review of Systems   Constitutional: Negative.    HENT: Negative.     Respiratory: Negative.     Cardiovascular: Negative.    Endocrine: Negative.    Musculoskeletal:  Positive for arthralgias.   Skin: Negative.    Neurological: Negative.    Hematological: Negative.    Psychiatric/Behavioral: Negative.         Objective   Right Hand Exam     Tenderness   Right hand tenderness location: Mild symptom aggravation with compression of webspace between thumb and index finger.    Range of Motion   The patient has normal right wrist ROM.     Tests   Phalen’s Sign: negative  Tinel's sign (median nerve): negative  Finkelstein's test: negative    Other   Erythema: absent  Sensation: normal  Pulse: present    Comments:  Full ROM of distal joints with no sx aggravation, distal motor and sensory intact, cap refill at 2 seconds.      Left Hand Exam     Tenderness   Left hand tenderness location: Generalized distal wrist through hand and all fingers.     Range of Motion   The patient has normal left wrist ROM.    Tests   Tinel's sign (median nerve): positive  Finkelstein's test: negative    Other   Erythema: absent  Sensation: normal  Pulse: present    Comments:  Full ROM of distal joints with no sx aggravation, distal motor and sensory intact, cap refill at 2 seconds.          Image Results:  === 07/11/24 ===    XR WRIST 3+ VIEWS BILATERAL    - Impression -  1. Unremarkable bilateral wrist radiographs.    MACRO:  None.    Signed by: Carmen Burgos 7/12/2024 6:40 PM  Dictation workstation:   [id number]    Nerve Conduction & EMG Report      Patient: Titus Inman   Patient ID: [id number]  Sex: Male  YOB: 1995    Visit Date: 8/12/2024 2:43 PM  Age: 29 Years  Examining MD: Cholo Garcia MD  Referred by: Shannan Lira DO  Temperature: 32.8  Current Height: 5 feet 7 inch  Referred for: EMG: BUE dropping things on the left side, sore and pain on the right side. Occ neck pain. No DM. NKDA.    Plan: This study is design to evaluate for entrapment neuropathy, median or ulnar neuropathy, radiculopathy, or brachial plexopathy. Procedure indication, side effects, complications, risk and alternatives were explain. Patient agreed to proceed with verbal consent obtain. Patient was instructed to clean the puncture site with soap  and water and put some ice pack for bruising.     Impression: This is a normal EMG. There is NO clear electrodiagnostic evidence of a bilateral cervical radiculopathy, brachial plexopathy, entrapment neuropathy, median or ulnar neuropathy at this time.    EMG Summary: The bilateral median and ulnar motor and sensory nerve conduction studies were normal. The bilateral radial sensory nerve conduction studies were also normal.     Needle EMG of the tested muscle showed no abnormal spontaneous activity. Normal motor unit action potentials and recruitment patterns were seen.    Cholo Garcia MD  Diplomate, ABPN, NBPAS  Clinical Neurophysiology, Neurology, Vascular Neurology and Sleep Medicine  [place], [place]  [phone]     Reviewed EMG report and discussed findings with patient for interpretation    Reviewed most recent OT appointment from 9/5/2024 on date of visit.  Per notes, patient is noticing some improvement in pain, no change in numb tingling sensation.    Reviewed lab workup from PCP from June 2024 of hemoglobin A1c, TSH, CBC, lipids, CMP.    Assessment/Plan   Encounter Diagnoses:    Problem List Items Addressed This Visit             ICD-10-CM    Bilateral hand numbness - Primary R20.0     Patient to continue use with compression gloves, weighted and heated mittens and nighttime bracing for symptom control  Complete OT eval visit for further recommendation  Continue home exercises daily as instructed  Referring for neurology evaluation, patient states he has seen a neurologist recently and is waiting for a follow-up appointment.  Referral to address hands provided.  PCP will be included on this note, reviewed labs from initial workup in June 2024 with no significantly abnormal findings.  We did discuss possibility of addressing vitamin levels either with PCP or neurology  Collaborated case with Dr. Bourgeois on date of visit who is in agreement with this plan of care  Patient to follow-up here on as  needed basis  Patient in agreement of the plan of care  This note was generated using Dragon software.  It may contain errors in wording, punctuation or spelling.         Relevant Orders    Referral to Neurology    Bilateral hand pain M79.641, M79.642    Relevant Orders    Referral to Neurology

## 2024-09-20 ENCOUNTER — HOSPITAL ENCOUNTER (OUTPATIENT)
Dept: HOSPITAL 100 - PSN | Age: 29
Discharge: HOME | End: 2024-09-20
Payer: MEDICAID

## 2024-09-20 DIAGNOSIS — G43.009: Primary | ICD-10-CM

## 2024-09-20 DIAGNOSIS — Z87.820: ICD-10-CM

## 2024-09-20 DIAGNOSIS — F41.9: ICD-10-CM

## 2024-09-20 DIAGNOSIS — R41.3: ICD-10-CM

## 2024-09-20 DIAGNOSIS — F32.A: ICD-10-CM

## 2024-09-20 PROCEDURE — 95819 EEG AWAKE AND ASLEEP: CPT

## 2024-09-23 ENCOUNTER — APPOINTMENT (OUTPATIENT)
Dept: OCCUPATIONAL THERAPY | Facility: CLINIC | Age: 29
End: 2024-09-23
Payer: MEDICAID

## 2024-09-23 ENCOUNTER — TREATMENT (OUTPATIENT)
Dept: OCCUPATIONAL THERAPY | Facility: CLINIC | Age: 29
End: 2024-09-23
Payer: MEDICAID

## 2024-09-23 DIAGNOSIS — R20.0 BILATERAL HAND NUMBNESS: ICD-10-CM

## 2024-09-23 PROCEDURE — 97110 THERAPEUTIC EXERCISES: CPT | Mod: GO

## 2024-09-23 PROCEDURE — 97530 THERAPEUTIC ACTIVITIES: CPT | Mod: GO

## 2024-09-23 ASSESSMENT — PAIN - FUNCTIONAL ASSESSMENT: PAIN_FUNCTIONAL_ASSESSMENT: 0-10

## 2024-09-23 ASSESSMENT — PAIN SCALES - GENERAL
PAINLEVEL_OUTOF10: 6
PAINLEVEL_OUTOF10: 6

## 2024-09-23 NOTE — PROGRESS NOTES
"Occupational Therapy Re Check    Patient Name: Titus Inman II  MRN: [id number]  Today's Date: 9/23/2024  Time Calculation  Start Time: 1400  Stop Time: 1445  Time Calculation (min): 45 min  Occupational Therapy       OT Therapeutic Procedures Time Entry  Therapeutic Activity Time Entry: 30  Therapeutic Exercise Time Entry: 15       General  General  Reason for Referral: Eval and Treat  Referred By: Esperanza Bowles CNP  General Comment: Visit 6    Current Problem  Problem List Items Addressed This Visit             ICD-10-CM    Bilateral hand numbness R20.0       Subjective  Subjective   Current Problem:  Pt states \"I am not good today.\"     Pain  Pain:  Pain Assessment  Pain Assessment: 0-10  0-10 (Numeric) Pain Score: 6  Pain Location: Hand  Pain Orientation: Right    Objective  Objective   General Visit Information:   Hand Function  Gross Grasp: Functional ( R 92# L 75# lateral R 17# L 14# 3jaw R 7# L 3#)  Coordination: Functional (9 hole peg R 26 seconds L 34 seconds.)    Sensory: [place] R and L hands digits 1-5 and palm 2.83. All WNL  Pt c/o that he has no feeling in his hands.    Outcomes  Quick Dash 65.91    Treatment  Therapeutic Exercise  Therapeutic Exercise Performed: Yes  Therapeutic Exercise Activity 1: Reviewed HEP with pt.    Therapeutic Activity  Therapeutic Activity Performed: Yes  Therapeutic Activity 1: Educated pt on adaptive techniques for sensory issues of his hands.  Including using gloves to do dishes, EC/WS, using a white board to plan his day.    Precautions As tolerated     Patient Education Educated pt on adaptive techniques to assist with ADLS/IADLS.    Assessment  Assessment/Plan Pt has been inconsistent with his therapy sessions.  Today pt did not do quite so well in some areas and other areas just fine.  Quick Dash score worsened since initial evaluation. OT will keep chart open, however no more insurance approval at this time. Pt will see neurologist soon.     OP " Plan  OT Plan: Keep chart open for one month then d/c.    GOALS  Active       OT Goals       OT Goal 1 (Not Progressing)       Start:  08/05/24    Expected End:  09/02/24       Pt will report less dropping of objects over the course of 4 weeks.  Pt will reduce Quick Dash score to approx 20.         OT Goal 2 (Progressing)       Start:  08/05/24    Expected End:  09/02/24       Pt will be independent with HEP carryover in order to reduce symptoms and improve hand function per pt report.

## 2024-10-07 ENCOUNTER — DOCUMENTATION (OUTPATIENT)
Dept: OCCUPATIONAL THERAPY | Facility: CLINIC | Age: 29
End: 2024-10-07
Payer: MEDICAID

## 2024-12-12 ENCOUNTER — HOSPITAL ENCOUNTER (OUTPATIENT)
Age: 29
Discharge: HOME | End: 2024-12-12
Payer: MEDICAID

## 2024-12-12 DIAGNOSIS — F41.9: ICD-10-CM

## 2024-12-12 DIAGNOSIS — R41.3: Primary | ICD-10-CM

## 2024-12-12 DIAGNOSIS — F32.A: ICD-10-CM

## 2024-12-12 LAB
FOLATES, (FOLIC ACID): 4.7 NG/ML (ref 3.1–55.4)
VITAMIN B12: 501 PG/ML (ref 211–911)

## 2024-12-12 PROCEDURE — 82652 VIT D 1 25-DIHYDROXY: CPT

## 2024-12-12 PROCEDURE — 82746 ASSAY OF FOLIC ACID SERUM: CPT

## 2024-12-12 PROCEDURE — 84425 ASSAY OF VITAMIN B-1: CPT

## 2024-12-12 PROCEDURE — 36415 COLL VENOUS BLD VENIPUNCTURE: CPT

## 2024-12-12 PROCEDURE — 82607 VITAMIN B-12: CPT

## 2024-12-16 LAB — 1,25(OH)2D3 SERPL-MCNC: 35 PG/ML (ref 24.8–81.5)

## 2024-12-18 ENCOUNTER — APPOINTMENT (OUTPATIENT)
Dept: PRIMARY CARE | Facility: CLINIC | Age: 29
End: 2024-12-18
Payer: MEDICAID

## 2024-12-18 VITALS
WEIGHT: 269 LBS | BODY MASS INDEX: 40.77 KG/M2 | DIASTOLIC BLOOD PRESSURE: 79 MMHG | HEIGHT: 68 IN | SYSTOLIC BLOOD PRESSURE: 127 MMHG | HEART RATE: 80 BPM

## 2024-12-18 DIAGNOSIS — F43.10 PTSD (POST-TRAUMATIC STRESS DISORDER): ICD-10-CM

## 2024-12-18 DIAGNOSIS — N43.3 HYDROCELE, UNSPECIFIED HYDROCELE TYPE: ICD-10-CM

## 2024-12-18 DIAGNOSIS — G43.119 INTRACTABLE MIGRAINE WITH AURA WITHOUT STATUS MIGRAINOSUS: Primary | ICD-10-CM

## 2024-12-18 PROCEDURE — 3008F BODY MASS INDEX DOCD: CPT | Performed by: INTERNAL MEDICINE

## 2024-12-18 PROCEDURE — 1036F TOBACCO NON-USER: CPT | Performed by: INTERNAL MEDICINE

## 2024-12-18 PROCEDURE — 99214 OFFICE O/P EST MOD 30 MIN: CPT | Performed by: INTERNAL MEDICINE

## 2024-12-18 RX ORDER — METHYLPREDNISOLONE 4 MG/1
TABLET ORAL
Qty: 21 TABLET | Refills: 0 | Status: SHIPPED | OUTPATIENT
Start: 2024-12-18 | End: 2024-12-24

## 2024-12-18 RX ORDER — PROMETHAZINE HYDROCHLORIDE 25 MG/1
25 TABLET ORAL EVERY 6 HOURS PRN
Qty: 30 TABLET | Refills: 0 | Status: SHIPPED | OUTPATIENT
Start: 2024-12-18 | End: 2024-12-25

## 2024-12-18 ASSESSMENT — ENCOUNTER SYMPTOMS: HEADACHES: 1
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
## 2024-12-19 LAB — VITAMIN B1, THIAMINE: 159.7 NMOL/L (ref 66.5–200)

## 2025-01-08 ENCOUNTER — HOSPITAL ENCOUNTER (OUTPATIENT)
Dept: HOSPITAL 100 - MRI | Age: 30
Discharge: HOME | End: 2025-01-08
Payer: MEDICAID

## 2025-01-08 DIAGNOSIS — R20.2: ICD-10-CM

## 2025-01-08 DIAGNOSIS — M54.2: Primary | ICD-10-CM

## 2025-01-08 PROCEDURE — 72141 MRI NECK SPINE W/O DYE: CPT

## 2025-01-21 ENCOUNTER — DOCUMENTATION (OUTPATIENT)
Dept: OCCUPATIONAL THERAPY | Facility: CLINIC | Age: 30
End: 2025-01-21
Payer: MEDICAID

## 2025-01-21 NOTE — PROGRESS NOTES
Occupational Therapy    Discharge Summary    Name: Titus Inman II  MRN: [id number]  : 1995  Date: 25    Discharge Summary: OT    Discharge Information: Date of discharge 25, Date of last visit 24, Date of evaluation 24, Number of attended visits 6, Referred by Esperanza Bowles CNP, and Referred for hand pain/numbness    Therapy Summary: Pt inconsistent with therapy in attending as well as performance.    Discharge Status: Discharged     Rehab Discharge Reason: Other Insurance

## (undated) DEVICE — SUTURE, PROLENE, 2-0, 30 IN, SH, BLUE

## (undated) DEVICE — DRESSING, GAUZE, SPONGE, 12 PLY, CURITY, 4 X 4 IN, RIGID TRAY, STERILE

## (undated) DEVICE — STRAP, ARMBOARD, 3IN, BLUE/WHITE, SECURE HOOK

## (undated) DEVICE — STRAP, KNEE AND BODY, SINGLE USE

## (undated) DEVICE — SOLUTION, IRRIGATION, SODIUM CHLORIDE 0.9%, 1000 ML, POUR BOTTLE

## (undated) DEVICE — TOWEL, OR, XRAY DECTECTABLE, 17 X 27, BLUE, STERILE

## (undated) DEVICE — GLOVE, PROTEXIS PI CLASSIC, SZ-8.0, PF, PF, LF

## (undated) DEVICE — SUTURE, CHROMIC GUT, 2-0, SH 27IN

## (undated) DEVICE — SUTURE, CHROMIC GUT, 3-0, SH 27"

## (undated) DEVICE — BRIEF, PANTY, MESH, XX-LG, PURPLE, BULK

## (undated) DEVICE — Device

## (undated) DEVICE — MASK, FACE, ANESTHESIA, ADULT, LARGE, P6, RED

## (undated) DEVICE — CIRCUIT, BREATHING, ADULT, B/V FILTER, HMEF, 3 L BAG, 108 IN

## (undated) DEVICE — SOLUTION, SCRUB, PVP IODINE, 4OZ

## (undated) DEVICE — DRESSING, GAUZE, SPONGE, KERLIX, SUPER, 6 X 6.75 IN, STERILE 10PK

## (undated) DEVICE — SOLUTION, PREP, PVP IODINE, FLIP TOP, 4OZ

## (undated) DEVICE — DRAPE, SURGICAL, 100 X 124 X 76